# Patient Record
Sex: MALE | Race: WHITE | NOT HISPANIC OR LATINO | Employment: OTHER | URBAN - METROPOLITAN AREA
[De-identification: names, ages, dates, MRNs, and addresses within clinical notes are randomized per-mention and may not be internally consistent; named-entity substitution may affect disease eponyms.]

---

## 2019-07-19 ENCOUNTER — TELEPHONE (OUTPATIENT)
Dept: UROLOGY | Facility: CLINIC | Age: 74
End: 2019-07-19

## 2019-07-19 PROBLEM — N40.1 ENLARGED PROSTATE WITH URINARY OBSTRUCTION: Status: ACTIVE | Noted: 2019-07-19

## 2019-07-19 PROBLEM — I10 ESSENTIAL HYPERTENSION: Status: ACTIVE | Noted: 2019-07-19

## 2019-07-19 PROBLEM — E11.9 DM (DIABETES MELLITUS): Chronic | Status: ACTIVE | Noted: 2019-07-19

## 2019-07-19 PROBLEM — A41.9 SEPSIS: Status: ACTIVE | Noted: 2019-07-19

## 2019-07-19 PROBLEM — R33.9 URINARY RETENTION: Status: ACTIVE | Noted: 2019-07-19

## 2019-07-19 PROBLEM — N13.8 ENLARGED PROSTATE WITH URINARY OBSTRUCTION: Status: ACTIVE | Noted: 2019-07-19

## 2019-07-19 NOTE — TELEPHONE ENCOUNTER
----- Message from Jose M Junior sent at 7/19/2019  2:45 PM CDT -----  Contact: grover with [facility]  Type: Needs Medical Advice    Who Called:  grover    Best Call Back Number: [phone]  Additional Information: grover called to speak with the provider about the pt and urinary retention

## 2019-07-20 PROBLEM — R33.8 ACUTE URINARY RETENTION: Status: ACTIVE | Noted: 2019-07-20

## 2019-07-21 PROBLEM — T83.511A URINARY TRACT INFECTION ASSOCIATED WITH INDWELLING URETHRAL CATHETER: Status: ACTIVE | Noted: 2019-07-21

## 2019-07-21 PROBLEM — A41.9 SEPSIS: Status: RESOLVED | Noted: 2019-07-19 | Resolved: 2019-07-21

## 2019-07-21 PROBLEM — N39.0 URINARY TRACT INFECTION ASSOCIATED WITH INDWELLING URETHRAL CATHETER: Status: ACTIVE | Noted: 2019-07-21

## 2020-09-18 PROBLEM — L03.115 CELLULITIS OF RIGHT FOOT: Status: ACTIVE | Noted: 2020-09-18

## 2020-09-18 PROBLEM — L08.9 DIABETIC FOOT INFECTION: Status: ACTIVE | Noted: 2020-09-18

## 2020-09-18 PROBLEM — E11.628 DIABETIC FOOT INFECTION: Status: ACTIVE | Noted: 2020-09-18

## 2020-09-18 PROBLEM — Z71.89 ACP (ADVANCE CARE PLANNING): Status: ACTIVE | Noted: 2020-09-18

## 2023-09-01 PROBLEM — E83.51 HYPOCALCEMIA: Status: ACTIVE | Noted: 2023-09-01

## 2023-09-01 PROBLEM — N13.30 BILATERAL HYDRONEPHROSIS: Status: ACTIVE | Noted: 2023-09-01

## 2023-09-01 PROBLEM — E11.29 TYPE 2 DIABETES MELLITUS WITH KIDNEY COMPLICATION, WITHOUT LONG-TERM CURRENT USE OF INSULIN: Status: ACTIVE | Noted: 2019-07-19

## 2023-09-01 PROBLEM — I48.92 ATRIAL FLUTTER: Status: ACTIVE | Noted: 2023-09-01

## 2023-09-01 PROBLEM — N17.9 ACUTE RENAL FAILURE: Status: ACTIVE | Noted: 2023-09-01

## 2023-09-01 PROBLEM — E11.9 DIABETES: Status: ACTIVE | Noted: 2019-07-19

## 2023-09-01 PROBLEM — K59.00 CONSTIPATION: Status: ACTIVE | Noted: 2023-09-01

## 2023-09-01 PROBLEM — E87.20 METABOLIC ACIDOSIS: Status: ACTIVE | Noted: 2023-09-01

## 2023-09-02 PROBLEM — I50.22 CHRONIC HFREF (HEART FAILURE WITH REDUCED EJECTION FRACTION): Status: ACTIVE | Noted: 2023-09-02

## 2023-09-02 PROBLEM — R31.0 GROSS HEMATURIA: Status: ACTIVE | Noted: 2023-09-02

## 2023-09-02 PROBLEM — I25.5 ISCHEMIC CARDIOMYOPATHY: Status: ACTIVE | Noted: 2023-09-02

## 2023-09-02 PROBLEM — D62 ACUTE BLOOD LOSS ANEMIA: Status: ACTIVE | Noted: 2023-09-02

## 2023-09-02 PROBLEM — R79.89 ELEVATED TROPONIN: Status: ACTIVE | Noted: 2023-09-02

## 2023-12-04 PROBLEM — N17.9 ACUTE RENAL FAILURE: Status: RESOLVED | Noted: 2023-09-01 | Resolved: 2023-12-04
